# Patient Record
Sex: MALE | HISPANIC OR LATINO | Employment: FULL TIME | URBAN - NONMETROPOLITAN AREA
[De-identification: names, ages, dates, MRNs, and addresses within clinical notes are randomized per-mention and may not be internally consistent; named-entity substitution may affect disease eponyms.]

---

## 2017-04-20 RX ORDER — LISINOPRIL 2.5 MG/1
TABLET ORAL
Qty: 30 TAB | Refills: 2 | Status: SHIPPED | OUTPATIENT
Start: 2017-04-20 | End: 2017-09-20 | Stop reason: SDUPTHER

## 2017-04-20 NOTE — TELEPHONE ENCOUNTER
Was the patient seen in the last year in this department? No     Does patient have an active prescription for medications requested? No     Received Request Via: Pharmacy      Pt met protocol?: No    LAST OV 03/25/2016    LAST LIPID PROFILE AND A1C 03/11/2016

## 2017-09-20 ENCOUNTER — OFFICE VISIT (OUTPATIENT)
Dept: MEDICAL GROUP | Facility: PHYSICIAN GROUP | Age: 47
End: 2017-09-20
Payer: COMMERCIAL

## 2017-09-20 VITALS
BODY MASS INDEX: 26.03 KG/M2 | WEIGHT: 162 LBS | DIASTOLIC BLOOD PRESSURE: 74 MMHG | TEMPERATURE: 97.4 F | HEART RATE: 68 BPM | SYSTOLIC BLOOD PRESSURE: 122 MMHG | RESPIRATION RATE: 16 BRPM | OXYGEN SATURATION: 97 % | HEIGHT: 66 IN

## 2017-09-20 DIAGNOSIS — J30.1 SEASONAL ALLERGIC RHINITIS DUE TO POLLEN: ICD-10-CM

## 2017-09-20 DIAGNOSIS — E11.9 TYPE 2 DIABETES MELLITUS WITHOUT COMPLICATION, WITHOUT LONG-TERM CURRENT USE OF INSULIN (HCC): ICD-10-CM

## 2017-09-20 DIAGNOSIS — Z23 NEED FOR INFLUENZA VACCINATION: ICD-10-CM

## 2017-09-20 DIAGNOSIS — E55.9 VITAMIN D INSUFFICIENCY: ICD-10-CM

## 2017-09-20 PROCEDURE — 90471 IMMUNIZATION ADMIN: CPT | Performed by: NURSE PRACTITIONER

## 2017-09-20 PROCEDURE — 90686 IIV4 VACC NO PRSV 0.5 ML IM: CPT | Performed by: NURSE PRACTITIONER

## 2017-09-20 PROCEDURE — 99214 OFFICE O/P EST MOD 30 MIN: CPT | Mod: 25 | Performed by: NURSE PRACTITIONER

## 2017-09-20 RX ORDER — LISINOPRIL 2.5 MG/1
2.5 TABLET ORAL
Qty: 90 TAB | Refills: 1 | Status: SHIPPED | OUTPATIENT
Start: 2017-09-20 | End: 2018-04-14 | Stop reason: SDUPTHER

## 2017-09-20 ASSESSMENT — PATIENT HEALTH QUESTIONNAIRE - PHQ9: CLINICAL INTERPRETATION OF PHQ2 SCORE: 0

## 2017-09-21 NOTE — ASSESSMENT & PLAN NOTE
Patient complains of increasing clear nasal drainage, cough, itchy ears and eyes and sneezing. He has not tried over-the-counter allergy medications. I did discuss with him starting second-generation antihistamines such as Zyrtec, Allegra, or Xyzal. I also encouraged him to add Flonase, one spray to each nostril twice a day as well as nasal saline, two sprays to each nostril 3 to 5 times daily as needed

## 2017-09-21 NOTE — ASSESSMENT & PLAN NOTE
Patient has history of type II diabetes, stable and well-controlled on metformin, 850 mg daily. Last hemoglobin A-1 C was 6 .0% in March 2016. He tolerates his medication well with no significant or bothersome side effects. He is also on lisinopril, 2.5 mg daily. He is not on statin therapy. He is due for labs, these have been ordered. Medications have been refilled for him. I would like him to follow up with me in six months, sooner if needed. We discussed the importance of proper foot care and annual eye exams. I did discuss with him that if his hemoglobin A-1 C is stable and still with the prediabetes range, we can consider decreasing metformin down to 500 mg daily.

## 2017-09-21 NOTE — ASSESSMENT & PLAN NOTE
Patient has history of vitamin D deficiency, status of control unknown. Last vitamin D level was 11, this was in March 2016. He has been consistently taking 50,000 units every week. We will recheck labs and it will notify him of results and for the actions if needed.

## 2017-09-21 NOTE — PROGRESS NOTES
Chief Complaint   Patient presents with   • Annual Exam     med refills, congestion         This is a 47 y.o.male patient that presents today with the following:establish care with new PCP, discussed acute and chronic conditions, medication refills    Type 2 diabetes mellitus without complication  Patient has history of type II diabetes, stable and well-controlled on metformin, 850 mg daily. Last hemoglobin A-1 C was 6 .0% in March 2016. He tolerates his medication well with no significant or bothersome side effects. He is also on lisinopril, 2.5 mg daily. He is not on statin therapy. He is due for labs, these have been ordered. Medications have been refilled for him. I would like him to follow up with me in six months, sooner if needed. We discussed the importance of proper foot care and annual eye exams. I did discuss with him that if his hemoglobin A-1 C is stable and still with the prediabetes range, we can consider decreasing metformin down to 500 mg daily.    Vitamin D insufficiency  Patient has history of vitamin D deficiency, status of control unknown. Last vitamin D level was 11, this was in March 2016. He has been consistently taking 50,000 units every week. We will recheck labs and it will notify him of results and for the actions if needed.    Seasonal allergic rhinitis due to pollen  Patient complains of increasing clear nasal drainage, cough, itchy ears and eyes and sneezing. He has not tried over-the-counter allergy medications. I did discuss with him starting second-generation antihistamines such as Zyrtec, Allegra, or Xyzal. I also encouraged him to add Flonase, one spray to each nostril twice a day as well as nasal saline, two sprays to each nostril 3 to 5 times daily as needed      No visits with results within 1 Month(s) from this visit.   Latest known visit with results is:   Hospital Outpatient Visit on 03/11/2016   Component Date Value   • WBC 03/11/2016 6.6    • RBC 03/11/2016 4.97    •  Hemoglobin 03/11/2016 14.8    • Hematocrit 03/11/2016 44.4    • MCV 03/11/2016 89.3    • MCH 03/11/2016 29.8    • MCHC 03/11/2016 33.3*   • RDW 03/11/2016 44.2    • Platelet Count 03/11/2016 287    • MPV 03/11/2016 9.9    • Neutrophils-Polys 03/11/2016 62.60    • Lymphocytes 03/11/2016 24.80    • Monocytes 03/11/2016 4.80    • Eosinophils 03/11/2016 6.50    • Basophils 03/11/2016 0.80    • Immature Granulocytes 03/11/2016 0.50    • Nucleated RBC 03/11/2016 0.00    • Neutrophils (Absolute) 03/11/2016 4.16    • Lymphs (Absolute) 03/11/2016 1.65    • Monos (Absolute) 03/11/2016 0.32    • Eos (Absolute) 03/11/2016 0.43    • Baso (Absolute) 03/11/2016 0.05    • Immature Granulocytes (a* 03/11/2016 0.03    • NRBC (Absolute) 03/11/2016 0.00    • Sodium 03/11/2016 137    • Potassium 03/11/2016 4.0    • Chloride 03/11/2016 103    • Co2 03/11/2016 27    • Anion Gap 03/11/2016 7.0    • Glucose 03/11/2016 98    • Bun 03/11/2016 18    • Creatinine 03/11/2016 0.94    • Calcium 03/11/2016 9.3    • AST(SGOT) 03/11/2016 15    • ALT(SGPT) 03/11/2016 15    • Alkaline Phosphatase 03/11/2016 74    • Total Bilirubin 03/11/2016 0.5    • Albumin 03/11/2016 4.8    • Total Protein 03/11/2016 7.4    • Globulin 03/11/2016 2.6    • A-G Ratio 03/11/2016 1.8    • 25-Hydroxy   Vitamin D 25 03/11/2016 11*   • Glycohemoglobin 03/11/2016 6.0*   • Est Avg Glucose 03/11/2016 126    • Cholesterol,Tot 03/11/2016 199    • Triglycerides 03/11/2016 293*   • HDL 03/11/2016 42    • LDL 03/11/2016 98    • Total Volume 03/11/2016 random    • Microalbumin, Urine Rand* 03/11/2016 0.3    • Micro Alb Creat Ratio 03/11/2016 2    • Creatinine Urine 03/11/2016 159    • Creatinine, Random Urine 03/11/2016 Not Applicable    • GFR If  03/11/2016 >60    • GFR If Non  Ameri* 03/11/2016 >60          clinical course has been stable    Past Medical History:   Diagnosis Date   • Diabetes (CMS-HCC)        No past surgical history on file.    Family  "History   Problem Relation Age of Onset   • Heart Disease Mother    • Diabetes Mother    • Heart Disease Father    • Diabetes Father        Review of patient's allergies indicates no known allergies.    Current Outpatient Prescriptions Ordered in [facility]   Medication Sig Dispense Refill   • lisinopril (PRINIVIL) 2.5 MG Tab Take 1 Tab by mouth every day. 90 Tab 1   • metformin (GLUCOPHAGE) 850 MG Tab Take 1 Tab by mouth every day. 90 Tab 1   • Cholecalciferol (VITAMIN D3) 84969 UNITS Cap Take 1 Cap by mouth every 7 days. 12 Cap 0     No current Epic-ordered facility-administered medications on file.        Constitutional ROS: No unexpected change in weight, No weakness, No unexplained fevers, sweats, or chills  Pulmonary ROS: positive for allergies  Cardiovascular ROS: No chest pain, No edema, No palpitations  Gastrointestinal ROS: No abdominal pain, No nausea, vomiting, diarrhea, or constipation, no blood in stool  Musculoskeletal/Extremities ROS: No clubbing, No peripheral edema, No pain, redness or swelling on the joints  Neurologic ROS: Normal development, No seizures, No weakness  Endocrine ROS: positive per HPI    Physical exam:  /74   Pulse 68   Temp 36.3 °C (97.4 °F)   Resp 16   Ht 1.676 m (5' 6\")   Wt 73.5 kg (162 lb)   SpO2 97%   BMI 26.15 kg/m²   General Appearance: middle-aged male,alert, no distress, moderately overweight, well groomed  Skin: Skin color, texture, turgor normal. No rashes or lesions.  Eyes: conjunctivae/corneas clear. PERRL, EOM's intact. Fundi benign  Ears: External ears normal. Canals clear. TM's normal.  Nose/Sinuses: positive findings: mucosa swollen, pale, and boggy  Oropharynx: Lips, mucosa, and tongue normal. Teeth and gums normal. Oropharynx moist and without lesion  Lungs: negative findings: normal respiratory rate and rhythm, lungs clear to auscultation  Heart: negative. RRR without murmur, gallop, or rubs.  No ectopy.  Abdomen: Abdomen soft, non-tender. BS normal. No " masses,  No organomegaly  Musculoskeletal: negative findings: ROM of all joints is normal, no evidence of joint instability, strength normal, no deformities present  Neurologic: intact, oriented, but appropriate, judgment intact. Cranial nerves two through 12 grossly intact    Medical decision making/discussion: Patient to start new allergy regimen including second-generation antihistamine, Flonase and generous use of nasal saline as discussed. Flonase can be used one spray to each nostril twice a day or 2 sprays to each nostril once a day. Nasal saline can be used 2 sprays to each nostril up to 3-5 times a day as needed. He will also have labs done sometime this week and I will notify him of results in further actions if needed. He is to follow up with me in six months, sooner if needed. He will get flu shot today. His medications have been refilled he is to take these as prescribed.      Emigdio was seen today for annual exam.    Diagnoses and all orders for this visit:    Type 2 diabetes mellitus without complication, without long-term current use of insulin (CMS-HCC)  -     COMP METABOLIC PANEL; Future  -     HEMOGLOBIN A1C; Future  -     LIPID PROFILE; Future  -     MICROALBUMIN CREAT RATIO URINE; Future  -     lisinopril (PRINIVIL) 2.5 MG Tab; Take 1 Tab by mouth every day.  -     metformin (GLUCOPHAGE) 850 MG Tab; Take 1 Tab by mouth every day.    Vitamin D insufficiency  -     VITAMIN D,25 HYDROXY; Future    Need for influenza vaccination  -     INFLUENZA VACCINE QUAD INJ >3Y(PF)    Seasonal allergic rhinitis due to pollen          Please note that this dictation was created using voice recognition software. I have made every reasonable attempt to correct obvious errors, but I expect that there are errors of grammar and possibly content that I did not discover before finalizing the note.

## 2017-09-21 NOTE — PATIENT INSTRUCTIONS
Labs this week, these are fasting    Follow up in 6 months, sooner if needed    Refilled meds    Flu shot today    Try Zyrtec, Allergra, or Xyzal. Also start flonase 1 spray to each nostril twice a day, and nasal saline 2 sprays to each nostril 3-5 times a day

## 2017-09-29 ENCOUNTER — HOSPITAL ENCOUNTER (OUTPATIENT)
Dept: LAB | Facility: MEDICAL CENTER | Age: 47
End: 2017-09-29
Attending: NURSE PRACTITIONER
Payer: COMMERCIAL

## 2017-09-29 DIAGNOSIS — E55.9 VITAMIN D INSUFFICIENCY: ICD-10-CM

## 2017-09-29 DIAGNOSIS — E11.9 TYPE 2 DIABETES MELLITUS WITHOUT COMPLICATION, WITHOUT LONG-TERM CURRENT USE OF INSULIN (HCC): ICD-10-CM

## 2017-09-29 LAB
25(OH)D3 SERPL-MCNC: 21 NG/ML (ref 30–100)
ALBUMIN SERPL BCP-MCNC: 4.1 G/DL (ref 3.2–4.9)
ALBUMIN/GLOB SERPL: 1.3 G/DL
ALP SERPL-CCNC: 73 U/L (ref 30–99)
ALT SERPL-CCNC: 19 U/L (ref 2–50)
ANION GAP SERPL CALC-SCNC: 9 MMOL/L (ref 0–11.9)
AST SERPL-CCNC: 17 U/L (ref 12–45)
BILIRUB SERPL-MCNC: 0.6 MG/DL (ref 0.1–1.5)
BUN SERPL-MCNC: 14 MG/DL (ref 8–22)
CALCIUM SERPL-MCNC: 9.4 MG/DL (ref 8.5–10.5)
CHLORIDE SERPL-SCNC: 105 MMOL/L (ref 96–112)
CHOLEST SERPL-MCNC: 208 MG/DL (ref 100–199)
CO2 SERPL-SCNC: 25 MMOL/L (ref 20–33)
CREAT SERPL-MCNC: 0.78 MG/DL (ref 0.5–1.4)
CREAT UR-MCNC: 212.3 MG/DL
EST. AVERAGE GLUCOSE BLD GHB EST-MCNC: 134 MG/DL
GFR SERPL CREATININE-BSD FRML MDRD: >60 ML/MIN/1.73 M 2
GLOBULIN SER CALC-MCNC: 3.2 G/DL (ref 1.9–3.5)
GLUCOSE SERPL-MCNC: 98 MG/DL (ref 65–99)
HBA1C MFR BLD: 6.3 % (ref 0–5.6)
HDLC SERPL-MCNC: 41 MG/DL
LDLC SERPL CALC-MCNC: 132 MG/DL
MICROALBUMIN UR-MCNC: <0.7 MG/DL
MICROALBUMIN/CREAT UR: NORMAL MG/G (ref 0–30)
POTASSIUM SERPL-SCNC: 4.2 MMOL/L (ref 3.6–5.5)
PROT SERPL-MCNC: 7.3 G/DL (ref 6–8.2)
SODIUM SERPL-SCNC: 139 MMOL/L (ref 135–145)
TRIGL SERPL-MCNC: 174 MG/DL (ref 0–149)

## 2017-09-29 PROCEDURE — 80061 LIPID PANEL: CPT

## 2017-09-29 PROCEDURE — 83036 HEMOGLOBIN GLYCOSYLATED A1C: CPT

## 2017-09-29 PROCEDURE — 36415 COLL VENOUS BLD VENIPUNCTURE: CPT

## 2017-09-29 PROCEDURE — 82570 ASSAY OF URINE CREATININE: CPT

## 2017-09-29 PROCEDURE — 80053 COMPREHEN METABOLIC PANEL: CPT

## 2017-09-29 PROCEDURE — 82306 VITAMIN D 25 HYDROXY: CPT

## 2017-09-29 PROCEDURE — 82043 UR ALBUMIN QUANTITATIVE: CPT

## 2017-10-01 DIAGNOSIS — E78.2 MIXED HYPERLIPIDEMIA: ICD-10-CM

## 2017-10-01 DIAGNOSIS — E11.9 TYPE 2 DIABETES MELLITUS WITHOUT COMPLICATION, WITHOUT LONG-TERM CURRENT USE OF INSULIN (HCC): ICD-10-CM

## 2018-04-14 DIAGNOSIS — E11.9 TYPE 2 DIABETES MELLITUS WITHOUT COMPLICATION, WITHOUT LONG-TERM CURRENT USE OF INSULIN (HCC): ICD-10-CM

## 2018-04-16 RX ORDER — LISINOPRIL 2.5 MG/1
TABLET ORAL
Qty: 90 TAB | Refills: 1 | Status: SHIPPED | OUTPATIENT
Start: 2018-04-16

## 2018-04-16 NOTE — TELEPHONE ENCOUNTER
.  Refill X 6 months, sent to pharmacy.Pt. Seen in the last 6 months per protocol.   Lab Results   Component Value Date/Time    SODIUM 139 09/29/2017 06:15 AM    POTASSIUM 4.2 09/29/2017 06:15 AM    CHLORIDE 105 09/29/2017 06:15 AM    CO2 25 09/29/2017 06:15 AM    GLUCOSE 98 09/29/2017 06:15 AM    BUN 14 09/29/2017 06:15 AM    CREATININE 0.78 09/29/2017 06:15 AM     '

## 2018-10-27 DIAGNOSIS — E11.9 TYPE 2 DIABETES MELLITUS WITHOUT COMPLICATION, WITHOUT LONG-TERM CURRENT USE OF INSULIN (HCC): ICD-10-CM

## 2018-10-30 NOTE — TELEPHONE ENCOUNTER
Last seen by PCP 9/17. Will send 1 month to pharmacy. Patient is due for an appointment. Please schedule.

## 2018-10-30 NOTE — TELEPHONE ENCOUNTER
Was the patient seen in the last year in this department? No     Does patient have an active prescription for medications requested? No     Received Request Via: Pharmacy      Pt met protocol?: No  pt last ov  9/2017 has no upcoming appt   Lab Results   Component Value Date/Time    HBA1C 6.3 (H) 09/29/2017 06:15 AM        Lab Results  Component Value Date/Time   CHOLSTRLTOT 208 (H) 09/29/2017 0615   TRIGLYCERIDE 174 (H) 09/29/2017 0615   HDL 41 09/29/2017 0615    (H) 09/29/2017 0615

## 2018-12-06 DIAGNOSIS — E11.9 TYPE 2 DIABETES MELLITUS WITHOUT COMPLICATION, WITHOUT LONG-TERM CURRENT USE OF INSULIN (HCC): ICD-10-CM

## 2018-12-06 NOTE — TELEPHONE ENCOUNTER
Was the patient seen in the last year in this department? NO    Does patient have an active prescription for medications requested? No     Received Request Via: Pharmacy      Pt met protocol?: NO    OV 9/17

## 2019-01-09 DIAGNOSIS — E11.9 TYPE 2 DIABETES MELLITUS WITHOUT COMPLICATION, WITHOUT LONG-TERM CURRENT USE OF INSULIN (HCC): ICD-10-CM

## 2019-01-10 NOTE — TELEPHONE ENCOUNTER
Was the patient seen in the last year in this department? No     Does patient have an active prescription for medications requested? No     Received Request Via: Pharmacy      Pt met protocol?: No, labs and ov 9/17 a1c 6.3 no appt coming up.

## 2019-01-10 NOTE — TELEPHONE ENCOUNTER
LOV 9/17. 1 month supply refilled. Please advise pt to make appt for follow-up and additional fills.

## 2019-02-23 DIAGNOSIS — E11.9 TYPE 2 DIABETES MELLITUS WITHOUT COMPLICATION, WITHOUT LONG-TERM CURRENT USE OF INSULIN (HCC): ICD-10-CM

## 2019-02-26 NOTE — TELEPHONE ENCOUNTER
Was the patient seen in the last year in this department? No     Does patient have an active prescription for medications requested? No     Received Request Via: Pharmacy      Pt met protocol?: No   Pt last ov 2/2017 has no upcoming appt    Lab Results   Component Value Date/Time    HBA1C 6.3 (H) 09/29/2017 06:15 AM        Lab Results  Component Value Date/Time   CHOLSTRLTOT 208 (H) 09/29/2017 0615   TRIGLYCERIDE 174 (H) 09/29/2017 0615   HDL 41 09/29/2017 0615    (H) 09/29/2017 0615

## 2019-03-09 DIAGNOSIS — E11.9 TYPE 2 DIABETES MELLITUS WITHOUT COMPLICATION, WITHOUT LONG-TERM CURRENT USE OF INSULIN (HCC): ICD-10-CM
